# Patient Record
Sex: FEMALE | Race: ASIAN | NOT HISPANIC OR LATINO
[De-identification: names, ages, dates, MRNs, and addresses within clinical notes are randomized per-mention and may not be internally consistent; named-entity substitution may affect disease eponyms.]

---

## 2018-05-24 ENCOUNTER — RESULT REVIEW (OUTPATIENT)
Age: 48
End: 2018-05-24

## 2020-11-20 ENCOUNTER — OUTPATIENT (OUTPATIENT)
Dept: OUTPATIENT SERVICES | Facility: HOSPITAL | Age: 50
LOS: 1 days | End: 2020-11-20
Payer: MEDICAID

## 2020-11-20 VITALS
TEMPERATURE: 99 F | WEIGHT: 156.09 LBS | HEIGHT: 57 IN | OXYGEN SATURATION: 98 % | HEART RATE: 78 BPM | SYSTOLIC BLOOD PRESSURE: 150 MMHG | RESPIRATION RATE: 16 BRPM | DIASTOLIC BLOOD PRESSURE: 92 MMHG

## 2020-11-20 DIAGNOSIS — K42.9 UMBILICAL HERNIA WITHOUT OBSTRUCTION OR GANGRENE: ICD-10-CM

## 2020-11-20 DIAGNOSIS — Z98.89 OTHER SPECIFIED POSTPROCEDURAL STATES: Chronic | ICD-10-CM

## 2020-11-20 DIAGNOSIS — K80.10 CALCULUS OF GALLBLADDER WITH CHRONIC CHOLECYSTITIS WITHOUT OBSTRUCTION: ICD-10-CM

## 2020-11-20 DIAGNOSIS — I10 ESSENTIAL (PRIMARY) HYPERTENSION: ICD-10-CM

## 2020-11-20 DIAGNOSIS — R30.0 DYSURIA: ICD-10-CM

## 2020-11-20 LAB
ALBUMIN SERPL ELPH-MCNC: 4.4 G/DL — SIGNIFICANT CHANGE UP (ref 3.3–5)
ALP SERPL-CCNC: 68 U/L — SIGNIFICANT CHANGE UP (ref 40–120)
ALT FLD-CCNC: 23 U/L — SIGNIFICANT CHANGE UP (ref 4–33)
ANION GAP SERPL CALC-SCNC: 13 MMO/L — SIGNIFICANT CHANGE UP (ref 7–14)
APPEARANCE UR: CLEAR — SIGNIFICANT CHANGE UP
AST SERPL-CCNC: 22 U/L — SIGNIFICANT CHANGE UP (ref 4–32)
BILIRUB SERPL-MCNC: 0.3 MG/DL — SIGNIFICANT CHANGE UP (ref 0.2–1.2)
BILIRUB UR-MCNC: NEGATIVE — SIGNIFICANT CHANGE UP
BLOOD UR QL VISUAL: NEGATIVE — SIGNIFICANT CHANGE UP
BUN SERPL-MCNC: 12 MG/DL — SIGNIFICANT CHANGE UP (ref 7–23)
CALCIUM SERPL-MCNC: 9.6 MG/DL — SIGNIFICANT CHANGE UP (ref 8.4–10.5)
CHLORIDE SERPL-SCNC: 101 MMOL/L — SIGNIFICANT CHANGE UP (ref 98–107)
CO2 SERPL-SCNC: 23 MMOL/L — SIGNIFICANT CHANGE UP (ref 22–31)
COLOR SPEC: YELLOW — SIGNIFICANT CHANGE UP
CREAT SERPL-MCNC: 0.61 MG/DL — SIGNIFICANT CHANGE UP (ref 0.5–1.3)
GLUCOSE SERPL-MCNC: 79 MG/DL — SIGNIFICANT CHANGE UP (ref 70–99)
GLUCOSE UR-MCNC: NEGATIVE — SIGNIFICANT CHANGE UP
HCT VFR BLD CALC: 41.7 % — SIGNIFICANT CHANGE UP (ref 34.5–45)
HGB BLD-MCNC: 13.3 G/DL — SIGNIFICANT CHANGE UP (ref 11.5–15.5)
KETONES UR-MCNC: NEGATIVE — SIGNIFICANT CHANGE UP
LEUKOCYTE ESTERASE UR-ACNC: NEGATIVE — SIGNIFICANT CHANGE UP
MCHC RBC-ENTMCNC: 29.2 PG — SIGNIFICANT CHANGE UP (ref 27–34)
MCHC RBC-ENTMCNC: 31.9 % — LOW (ref 32–36)
MCV RBC AUTO: 91.4 FL — SIGNIFICANT CHANGE UP (ref 80–100)
NITRITE UR-MCNC: NEGATIVE — SIGNIFICANT CHANGE UP
NRBC # FLD: 0 K/UL — SIGNIFICANT CHANGE UP (ref 0–0)
PH UR: 6.5 — SIGNIFICANT CHANGE UP (ref 5–8)
PLATELET # BLD AUTO: 236 K/UL — SIGNIFICANT CHANGE UP (ref 150–400)
PMV BLD: 12 FL — SIGNIFICANT CHANGE UP (ref 7–13)
POTASSIUM SERPL-MCNC: 3.6 MMOL/L — SIGNIFICANT CHANGE UP (ref 3.5–5.3)
POTASSIUM SERPL-SCNC: 3.6 MMOL/L — SIGNIFICANT CHANGE UP (ref 3.5–5.3)
PROT SERPL-MCNC: 7.3 G/DL — SIGNIFICANT CHANGE UP (ref 6–8.3)
PROT UR-MCNC: 10 — SIGNIFICANT CHANGE UP
RBC # BLD: 4.56 M/UL — SIGNIFICANT CHANGE UP (ref 3.8–5.2)
RBC # FLD: 12.8 % — SIGNIFICANT CHANGE UP (ref 10.3–14.5)
SODIUM SERPL-SCNC: 137 MMOL/L — SIGNIFICANT CHANGE UP (ref 135–145)
SP GR SPEC: 1.02 — SIGNIFICANT CHANGE UP (ref 1–1.04)
UROBILINOGEN FLD QL: NORMAL — SIGNIFICANT CHANGE UP
WBC # BLD: 7.97 K/UL — SIGNIFICANT CHANGE UP (ref 3.8–10.5)
WBC # FLD AUTO: 7.97 K/UL — SIGNIFICANT CHANGE UP (ref 3.8–10.5)

## 2020-11-20 PROCEDURE — 93010 ELECTROCARDIOGRAM REPORT: CPT

## 2020-11-20 NOTE — H&P PST ADULT - MUSCULOSKELETAL
details… No joint pain, swelling or deformity; no limitation of movement detailed exam no calf tenderness/no joint erythema/no joint swelling/ROM intact

## 2020-11-20 NOTE — H&P PST ADULT - ENMT COMMENTS
pt reports for years she has decreased hearing bilaterally. s/p hearing test which showed left hearing loss. Pt gets occasional sinus pain secondary to seasonal allergies

## 2020-11-20 NOTE — H&P PST ADULT - NEUROLOGICAL COMMENTS
Pt reports since her husbands passing she started to experience severe headaches. Seen by Neurologist and scans negative . Pt scheduled for EEG. Pt reports headaches are less frequent and intense

## 2020-11-20 NOTE — H&P PST ADULT - NEGATIVE OPHTHALMOLOGIC SYMPTOMS
no diplopia/no photophobia/use reading glasses/no lacrimation L/no lacrimation R/no pain R/no loss of vision L/no loss of vision R/no pain L use reading glasses/no lacrimation R/no photophobia/no pain L/no blurred vision R/no lacrimation L/no blurred vision L/no loss of vision R/no pain R/no loss of vision L/no diplopia

## 2020-11-20 NOTE — H&P PST ADULT - NSICDXPASTMEDICALHX_GEN_ALL_CORE_FT
PAST MEDICAL HISTORY:  Anemia, iron deficiency     Dyspnea on exertion     HTN (hypertension)     Hyperlipidemia     Seasonal allergies     Secondary dysmenorrhea      PAST MEDICAL HISTORY:  Anemia, iron deficiency     Dyspnea on exertion resolved    HTN (hypertension)     Hyperlipidemia     Seasonal allergies     Secondary dysmenorrhea

## 2020-11-20 NOTE — H&P PST ADULT - CARDIOVASCULAR COMMENTS
"I used to get short of breath  I follow up with cardiologist , I had echo and stress test done last year I was not able to follow up this year due to covid"

## 2020-11-20 NOTE — H&P PST ADULT - NEGATIVE GENERAL GENITOURINARY SYMPTOMS
normal urinary frequency/no flank pain L/no incontinence/no hematuria/no flank pain R/no renal colic

## 2020-11-20 NOTE — H&P PST ADULT - NEGATIVE NEUROLOGICAL SYMPTOMS
no transient paralysis/no weakness/no paresthesias/no generalized seizures/no focal seizures/no syncope/no difficulty walking/no vertigo/no loss of sensation/no tremors

## 2020-11-20 NOTE — H&P PST ADULT - RS GEN PE MLT RESP DETAILS PC
clear to auscultation bilaterally/breath sounds equal/respirations non-labored/good air movement/airway patent/no chest wall tenderness no rales/respirations non-labored/no wheezes/clear to auscultation bilaterally/breath sounds equal/good air movement/airway patent/no chest wall tenderness/no rhonchi

## 2020-11-20 NOTE — H&P PST ADULT - HISTORY OF PRESENT ILLNESS
50 year old female presents to Presurgical testing with diagnosis of Umbilical hernia without obstruction or gangrene and calculus of gallbladder with chronic cholecystitis without obstruction scheduled for Laparoscopic cholecystectomy, umbilical hernia repair. Patient with c/o abdominal discomfort after eating since 2017 . Recently followed up ultrasound and CT scan revealed sludge to gall bladder. Patient had endoscopy on 8/28/20 which revealed eosinophilic esophagitis and was treated with Flagyl, Omeprazole, doxycyline and levofloxacin. Course completed 2 weeks ago. 50 year old female presents to Presurgical testing with diagnosis of Umbilical hernia without obstruction or gangrene and calculus of gallbladder with chronic cholecystitis without obstruction scheduled for Laparoscopic cholecystectomy, umbilical hernia repair. Patient with c/o abdominal discomfort after eating since 2017 . Recently followed up with ultrasound and CT scan revealed sludge to gall bladder. Patient had endoscopy on 8/28/20 which revealed eosinophilic esophagitis and was treated with Flagyl, Omeprazole, doxycyline and levofloxacin. Course completed 2 weeks ago.

## 2020-11-20 NOTE — H&P PST ADULT - GASTROINTESTINAL COMMENTS
I get discomfort in my abdomen after I eat. Pre op dx : Umbilical hernia without obstruction or gangrene and calculus of gallbladder with chronic cholecystitis without obstruction + umbilical hernia, non tender. + umbilical hernia, non tender. pre op dx:  Umbilical hernia without obstruction or gangrene and calculus of gallbladder with chronic cholecystitis without obstruction

## 2020-11-20 NOTE — H&P PST ADULT - NSICDXFAMILYHX_GEN_ALL_CORE_FT
FAMILY HISTORY:  Mother  Still living? Yes, Estimated age: Age Unknown  Family history of diabetes mellitus, Age at diagnosis: Age Unknown  Family history of hypertension, Age at diagnosis: Age Unknown

## 2020-11-20 NOTE — H&P PST ADULT - NEGATIVE MUSCULOSKELETAL SYMPTOMS
no stiffness/no muscle cramps/no arthralgia/no joint swelling/no myalgia/no muscle weakness/no arthritis

## 2020-11-20 NOTE — H&P PST ADULT - NSICDXPASTSURGICALHX_GEN_ALL_CORE_FT
PAST SURGICAL HISTORY:  H/O  section x1 in     H/O removal of cyst right axilla     H/O: hysterectomy     S/P biopsy uterine biospy

## 2020-11-20 NOTE — H&P PST ADULT - GENERAL GENITOURINARY SYMPTOMS
dysuria/" I have pain while urinating and itch to my vaginal area" " I have pain while urinating and itch to my vaginal area since one week now"/dysuria

## 2020-11-20 NOTE — H&P PST ADULT - REASON FOR ADMISSION
Umbilical hernia without obstruction or gangrene and calculus of gallbladder with chronic cholecystitis without obstruction

## 2020-11-20 NOTE — H&P PST ADULT - NSICDXPROBLEM_GEN_ALL_CORE_FT
PROBLEM DIAGNOSES  Problem: Calculus of gallbladder with chronic cholecystitis without obstruction  Assessment and Plan: Patient tentatively scheduled for Laparoscopic cholecystectomy, umbilical hernia repair on 11/25/20.  Pre-op instructions provided. Pt given verbal and written instructions with teach back on chlorhexidine shampoo and pepcid. Pt verbalized understanding with return demonstration.   Covid testing scheduled prior to surgery as per patient.     Problem: Dysuria  Assessment and Plan: Patient with c/o dysuria and vaginal  itch for the last one week.  Patient advised to see medical eval. Patient verblized understanding.     Problem: HTN (hypertension)  Assessment and Plan: Patient instructed to take amlodipine /benazepril with a sip of water on the morning of procedure.        PROBLEM DIAGNOSES  Problem: Calculus of gallbladder with chronic cholecystitis without obstruction  Assessment and Plan: Patient tentatively scheduled for Laparoscopic cholecystectomy, umbilical hernia repair on 11/25/20.  Pre-op instructions provided. Pt given verbal and written instructions with teach back on chlorhexidine shampoo and pepcid. Pt verbalized understanding with return demonstration.   Covid testing scheduled prior to surgery as per patient.       Problem: Dysuria  Assessment and Plan: Patient with c/o dysuria and vaginal  itch for the last one week.  UA and C/S sent.   Patient advised to see medical eval. Patient verblized understanding.     Problem: HTN (hypertension)  Assessment and Plan: Patient instructed to take amlodipine /benazepril with a sip of water on the morning of procedure.   Comparison EKG, Echo and Stress test results requested.

## 2020-11-22 ENCOUNTER — APPOINTMENT (OUTPATIENT)
Dept: DISASTER EMERGENCY | Facility: CLINIC | Age: 50
End: 2020-11-22

## 2020-11-22 LAB
CULTURE RESULTS: SIGNIFICANT CHANGE UP
SPECIMEN SOURCE: SIGNIFICANT CHANGE UP

## 2020-12-03 DIAGNOSIS — Z01.818 ENCOUNTER FOR OTHER PREPROCEDURAL EXAMINATION: ICD-10-CM

## 2020-12-04 ENCOUNTER — APPOINTMENT (OUTPATIENT)
Dept: DISASTER EMERGENCY | Facility: CLINIC | Age: 50
End: 2020-12-04

## 2020-12-06 ENCOUNTER — TRANSCRIPTION ENCOUNTER (OUTPATIENT)
Age: 50
End: 2020-12-06

## 2020-12-06 LAB — SARS-COV-2 N GENE NPH QL NAA+PROBE: NOT DETECTED

## 2020-12-07 ENCOUNTER — RESULT REVIEW (OUTPATIENT)
Age: 50
End: 2020-12-07

## 2020-12-07 ENCOUNTER — OUTPATIENT (OUTPATIENT)
Dept: OUTPATIENT SERVICES | Facility: HOSPITAL | Age: 50
LOS: 1 days | Discharge: HOME | End: 2020-12-07
Payer: MEDICAID

## 2020-12-07 VITALS
DIASTOLIC BLOOD PRESSURE: 78 MMHG | RESPIRATION RATE: 16 BRPM | TEMPERATURE: 98 F | SYSTOLIC BLOOD PRESSURE: 121 MMHG | HEART RATE: 84 BPM

## 2020-12-07 VITALS
SYSTOLIC BLOOD PRESSURE: 156 MMHG | RESPIRATION RATE: 18 BRPM | DIASTOLIC BLOOD PRESSURE: 72 MMHG | HEART RATE: 75 BPM | TEMPERATURE: 98 F | OXYGEN SATURATION: 99 % | WEIGHT: 156.09 LBS | HEIGHT: 57 IN

## 2020-12-07 DIAGNOSIS — K42.9 UMBILICAL HERNIA WITHOUT OBSTRUCTION OR GANGRENE: ICD-10-CM

## 2020-12-07 DIAGNOSIS — Z98.89 OTHER SPECIFIED POSTPROCEDURAL STATES: Chronic | ICD-10-CM

## 2020-12-07 PROCEDURE — 88304 TISSUE EXAM BY PATHOLOGIST: CPT | Mod: 26

## 2020-12-07 PROCEDURE — 88302 TISSUE EXAM BY PATHOLOGIST: CPT | Mod: 26

## 2020-12-07 RX ORDER — ATORVASTATIN CALCIUM 80 MG/1
1 TABLET, FILM COATED ORAL
Qty: 0 | Refills: 0 | DISCHARGE

## 2020-12-07 RX ORDER — SODIUM CHLORIDE 9 MG/ML
1000 INJECTION, SOLUTION INTRAVENOUS
Refills: 0 | Status: DISCONTINUED | OUTPATIENT
Start: 2020-12-07 | End: 2020-12-21

## 2020-12-07 RX ORDER — AMLODIPINE BESYLATE AND BENAZEPRIL HYDROCHLORIDE 10; 20 MG/1; MG/1
1 CAPSULE ORAL
Qty: 0 | Refills: 0 | DISCHARGE

## 2020-12-07 RX ORDER — ASPIRIN/CALCIUM CARB/MAGNESIUM 324 MG
1 TABLET ORAL
Qty: 0 | Refills: 0 | DISCHARGE

## 2020-12-07 RX ORDER — FERROUS SULFATE 325(65) MG
1 TABLET ORAL
Qty: 0 | Refills: 0 | DISCHARGE

## 2020-12-07 NOTE — ASU DISCHARGE PLAN (ADULT/PEDIATRIC) - ASU DC SPECIAL INSTRUCTIONSFT
Please follow up with Dr. Tinajero next week.  Do not remove dressings. If they fall off, do not remove the steri strips underneath.  You may shower tomorrow. Allow soap and water to wash over dressings and pat dry.  Do not submerge surgical sites in water.  You may take tylenol and ibuprofen for pain control. Take prescribed pain medication as needed.  No heavy lifting for 2 weeks.

## 2020-12-07 NOTE — ASU DISCHARGE PLAN (ADULT/PEDIATRIC) - CARE PROVIDER_API CALL
Lester Tinajero  SURGERY  [address] [place] [address]  Phone: [phone]  Fax: [phone]  Follow Up Time: 1 week

## 2020-12-07 NOTE — BRIEF OPERATIVE NOTE - NSICDXBRIEFPROCEDURE_GEN_ALL_CORE_FT
PROCEDURES:  Repair, hernia, umbilical, adult 07-Dec-2020 12:03:53  Janet Salazar  Laparoscopic cholecystectomy 07-Dec-2020 12:03:40  Janet Salazar

## 2020-12-07 NOTE — ASU DISCHARGE PLAN (ADULT/PEDIATRIC) - FOLLOW UP APPOINTMENTS
[facility] ASU (Adult): [facility] ([facility]):

## 2020-12-17 LAB — SURGICAL PATHOLOGY STUDY: SIGNIFICANT CHANGE UP

## 2022-08-27 NOTE — H&P PST ADULT - NSANTHOSAYNRD_GEN_A_CORE
Arrives with pain to her low abdomen/pubic area that started about 0900 today. Denies urinary symptoms, denies N/V/D. Alert and oriented, ABCs intact.     Triage Assessment     Row Name 08/27/22 1300       Triage Assessment (Adult)    Airway WDL WDL       Respiratory WDL    Respiratory WDL WDL       Skin Circulation/Temperature WDL    Skin Circulation/Temperature WDL WDL       Cardiac WDL    Cardiac WDL WDL       Peripheral/Neurovascular WDL    Peripheral Neurovascular WDL WDL       Cognitive/Neuro/Behavioral WDL    Cognitive/Neuro/Behavioral WDL WDL               No. JUANIS screening performed.  STOP BANG Legend: 0-2 = LOW Risk; 3-4 = INTERMEDIATE Risk; 5-8 = HIGH Risk

## 2024-10-10 ENCOUNTER — EMERGENCY (EMERGENCY)
Facility: HOSPITAL | Age: 54
LOS: 1 days | Discharge: HOME | End: 2024-10-10
Admitting: EMERGENCY MEDICINE
Payer: MEDICAID

## 2024-10-10 VITALS
HEIGHT: 56 IN | WEIGHT: 154.98 LBS | RESPIRATION RATE: 16 BRPM | HEART RATE: 95 BPM | OXYGEN SATURATION: 98 % | DIASTOLIC BLOOD PRESSURE: 104 MMHG | SYSTOLIC BLOOD PRESSURE: 177 MMHG | TEMPERATURE: 98 F

## 2024-10-10 VITALS
RESPIRATION RATE: 18 BRPM | OXYGEN SATURATION: 95 % | SYSTOLIC BLOOD PRESSURE: 119 MMHG | TEMPERATURE: 98 F | HEART RATE: 72 BPM | DIASTOLIC BLOOD PRESSURE: 76 MMHG

## 2024-10-10 DIAGNOSIS — Z98.89 OTHER SPECIFIED POSTPROCEDURAL STATES: Chronic | ICD-10-CM

## 2024-10-10 PROCEDURE — 73502 X-RAY EXAM HIP UNI 2-3 VIEWS: CPT | Mod: 26,LT

## 2024-10-10 PROCEDURE — 99284 EMERGENCY DEPT VISIT MOD MDM: CPT

## 2024-10-10 RX ORDER — KETOROLAC TROMETHAMINE 10 MG/1
30 TABLET, FILM COATED ORAL ONCE
Refills: 0 | Status: DISCONTINUED | OUTPATIENT
Start: 2024-10-10 | End: 2024-10-10

## 2024-10-10 RX ORDER — DICLOFENAC SODIUM 75 MG
1 TABLET, DELAYED RELEASE (ENTERIC COATED) ORAL
Qty: 10 | Refills: 0
Start: 2024-10-10 | End: 2024-10-14

## 2024-10-10 RX ADMIN — KETOROLAC TROMETHAMINE 30 MILLIGRAM(S): 10 TABLET, FILM COATED ORAL at 19:30

## 2024-10-10 RX ADMIN — KETOROLAC TROMETHAMINE 30 MILLIGRAM(S): 10 TABLET, FILM COATED ORAL at 18:50

## 2024-10-10 RX ADMIN — KETOROLAC TROMETHAMINE 30 MILLIGRAM(S): 10 TABLET, FILM COATED ORAL at 18:21

## 2024-10-10 NOTE — ED PROVIDER NOTE - PROGRESS NOTE DETAILS
KARON Huffman - patient reassessed, states feeling better after medications and able to ambulate without difficulty.   Xrays negative for acute pathology  Stable for DC with outpt ortho follow up.

## 2024-10-10 NOTE — ED PROVIDER NOTE - PATIENT PORTAL LINK FT
You can access the FollowMyHealth Patient Portal offered by [facility] by registering at the following website: [email or web address]. By joining [employer]’s FollowMyHealth portal, you will also be able to view your health information using other applications (apps) compatible with our system.

## 2024-10-10 NOTE — ED ADULT NURSE NOTE - OBJECTIVE STATEMENT
pt received in wellness area. A&OX4, pt complaining of left groin pain x 3 days.  Patient states the last 2 days has been having pain to her left groin patient states pain is worse with movement. Has been taking Tylenol with minimal relief.  Pt denies recent trauma or injury. denies numbness/ tingling, fevers/ chills, weakness ,dizziness. resp even and unlabored. ambulating with steady gait.. ongoing eval in progress.

## 2024-10-10 NOTE — ED PROVIDER NOTE - CLINICAL SUMMARY MEDICAL DECISION MAKING FREE TEXT BOX
54-year-old female presented emergency room complaint of left groin pain x 3 days  Unclear etiology possible bursitis rule out bony injury low suspicion for infection or abscess at this time  Toradol Percocet  X-ray hip/pelvis  Reassess

## 2024-10-10 NOTE — ED PROVIDER NOTE - NSFOLLOWUPINSTRUCTIONS_ED_ALL_ED_FT
Groin Pain    WHAT YOU NEED TO KNOW:    Groin pain may be felt only in your groin, or it may spread to your buttocks, thigh, or knee. An injury to your hip joint, pelvic area, lower back, or thighs can cause groin pain.    DISCHARGE INSTRUCTIONS:    Medicines: You may need any of the following:     NSAIDs, such as ibuprofen, help decrease swelling, pain, and fever. This medicine is available with or without a doctor's order. NSAIDs can cause stomach bleeding or kidney problems in certain people. If you take blood thinner medicine, always ask if NSAIDs are safe for you. Always read the medicine label and follow directions. Do not give these medicines to children under 6 months of age without direction from your child's healthcare provider.      Acetaminophen decreases pain. It is available without a doctor's order. Ask how much to take and how often to take it. Follow directions. Acetaminophen can cause liver damage if not taken correctly.       Take your medicine as directed. Contact your healthcare provider if you think your medicine is not helping or if you have side effects. Tell him of her if you are allergic to any medicine. Keep a list of the medicines, vitamins, and herbs you take. Include the amounts, and when and why you take them. Bring the list or the pill bottles to follow-up visits. Carry your medicine list with you in case of an emergency.    Follow up with your healthcare provider as directed: You may need to return for more tests. Write down your questions so you remember to ask them during your visits.     Self-care:     Rest as much as possible. Avoid activities that cause or increase your pain.      Apply ice on your groin for 15 to 20 minutes every hour or as directed. Use an ice pack, or put crushed ice in a plastic bag. Cover it with a towel. Ice helps prevent tissue damage and decreases swelling and pain.       Apply heat on your groin for 20 to 30 minutes every 2 hours for as many days as directed. Heat helps decrease pain and muscle spasms.     Contact your healthcare provider if:     You have a fever.       You have questions or concerns about your condition or care.    Return to the emergency department if:     You have severe pain even after you take medicine.       You have pain or burning when you urinate.       You have pain on your side that spreads to your groin.         © Copyright [employer] 2019 All illustrations and images included in CareNotes are the copyrighted property of [employer] Inc[employer] or [employer].

## 2024-10-10 NOTE — ED ADULT TRIAGE NOTE - CHIEF COMPLAINT QUOTE
C/O left groin pain, tenderness since Tuesday. Denies urinary symptoms. Denies fevers. History HTN, HLD.

## 2024-10-10 NOTE — ED PROVIDER NOTE - PHYSICAL EXAMINATION
Gen: Well appearing in NAD  Head: NC/AT  Neck: trachea midline  Resp:  No distress  Ext: no deformities  Neuro:  A&O appears non focal  Skin:  Warm and dry as visualized  Psych:  Normal affect and mood     Left groin: No swelling no obvious deformity no erythema or streaking positive pain to deep palpation no deformity palpated.  Decreased range of motion of hip secondary to pain difficulty straight leg raising due to pain patient can ambulate but limping due to pain.  Sensations intact

## 2024-10-10 NOTE — ED PROVIDER NOTE - NSICDXPASTMEDICALHX_GEN_ALL_CORE_FT
PAST MEDICAL HISTORY:  Anemia, iron deficiency     Dyspnea on exertion resolved    HTN (hypertension)     Hyperlipidemia     Seasonal allergies     Secondary dysmenorrhea

## 2024-10-10 NOTE — ED ADULT NURSE NOTE - NSFALLUNIVINTERV_ED_ALL_ED
Bed/Stretcher in lowest position, wheels locked, appropriate side rails in place/Call bell, personal items and telephone in reach/Instruct patient to call for assistance before getting out of bed/chair/stretcher/Non-slip footwear applied when patient is off stretcher/[place] to call system/Physically safe environment - no spills, clutter or unnecessary equipment/Purposeful proactive rounding/Room/bathroom lighting operational, light cord in reach

## 2024-10-10 NOTE — ED PROVIDER NOTE - OBJECTIVE STATEMENT
54-year-old female history of hypertension and high cholesterol presents emergency room complaining of left groin pain x 3 days.  Patient states the last 2 days has been having pain to her left groin patient states pain is worse with movement and feels like anytime she moves her foot or bends her knee she feels pain rating to her groin.  Patient states is painful to walk has tried taking Tylenol with minimal relief.  Patient denies recent trauma or overuse to area.  Patient states no redness or warmth to area does not feel any swelling when she presses.  But she states is tender to palpation when she presses deep into the area.  Patient denies previous pain like this in the past denies numbness tingling fevers chills weakness dizziness loss of consciousness